# Patient Record
Sex: MALE | Race: WHITE | NOT HISPANIC OR LATINO | URBAN - METROPOLITAN AREA
[De-identification: names, ages, dates, MRNs, and addresses within clinical notes are randomized per-mention and may not be internally consistent; named-entity substitution may affect disease eponyms.]

---

## 2021-04-15 DIAGNOSIS — Z23 ENCOUNTER FOR IMMUNIZATION: ICD-10-CM

## 2024-05-08 ENCOUNTER — OFFICE VISIT (OUTPATIENT)
Dept: PRIMARY CARE | Facility: CLINIC | Age: 79
End: 2024-05-08
Payer: MEDICARE

## 2024-05-08 VITALS
DIASTOLIC BLOOD PRESSURE: 84 MMHG | OXYGEN SATURATION: 95 % | HEIGHT: 68 IN | SYSTOLIC BLOOD PRESSURE: 136 MMHG | TEMPERATURE: 98.4 F | WEIGHT: 192.4 LBS | BODY MASS INDEX: 29.16 KG/M2 | HEART RATE: 57 BPM

## 2024-05-08 DIAGNOSIS — J30.9 ALLERGIC RHINITIS, UNSPECIFIED SEASONALITY, UNSPECIFIED TRIGGER: ICD-10-CM

## 2024-05-08 DIAGNOSIS — C61 MALIGNANT NEOPLASM OF PROSTATE (CMS/HCC): Primary | ICD-10-CM

## 2024-05-08 DIAGNOSIS — K62.1 ANORECTAL POLYP: ICD-10-CM

## 2024-05-08 DIAGNOSIS — G47.33 OBSTRUCTIVE SLEEP APNEA SYNDROME: ICD-10-CM

## 2024-05-08 DIAGNOSIS — K62.0 ANORECTAL POLYP: ICD-10-CM

## 2024-05-08 DIAGNOSIS — K64.9 HEMORRHOIDS, UNSPECIFIED HEMORRHOID TYPE: ICD-10-CM

## 2024-05-08 DIAGNOSIS — H54.7 LOW VISION, UNSPECIFIED LEFT EYE VISUAL IMPAIRMENT CATEGORY, UNSPECIFIED RIGHT EYE VISUAL IMPAIRMENT CATEGORY: ICD-10-CM

## 2024-05-08 DIAGNOSIS — H35.50 RETINAL DYSTROPHY: ICD-10-CM

## 2024-05-08 DIAGNOSIS — M50.30 DDD (DEGENERATIVE DISC DISEASE), CERVICAL: ICD-10-CM

## 2024-05-08 DIAGNOSIS — H35.50 HEREDITARY RETINAL DYSTROPHY: ICD-10-CM

## 2024-05-08 DIAGNOSIS — E78.2 MIXED HYPERLIPIDEMIA: ICD-10-CM

## 2024-05-08 PROBLEM — E78.5 DYSLIPIDEMIA: Status: RESOLVED | Noted: 2020-11-03 | Resolved: 2024-05-08

## 2024-05-08 PROBLEM — E78.5 DYSLIPIDEMIA: Status: ACTIVE | Noted: 2020-11-03

## 2024-05-08 PROCEDURE — 99204 OFFICE O/P NEW MOD 45 MIN: CPT | Performed by: INTERNAL MEDICINE

## 2024-05-08 RX ORDER — FLUTICASONE PROPIONATE 50 MCG
2 SPRAY, SUSPENSION (ML) NASAL DAILY
Qty: 16 G | Refills: 5 | Status: SHIPPED | OUTPATIENT
Start: 2024-05-08

## 2024-05-08 RX ORDER — BICALUTAMIDE 50 MG/1
50 TABLET, FILM COATED ORAL DAILY
COMMUNITY
Start: 2024-03-06 | End: 2024-05-08 | Stop reason: ALTCHOICE

## 2024-05-08 RX ORDER — CETIRIZINE HYDROCHLORIDE 5 MG/1
5 TABLET ORAL DAILY
COMMUNITY

## 2024-05-08 RX ORDER — ASPIRIN 81 MG/1
81 TABLET ORAL SEE ADMIN INSTRUCTIONS
COMMUNITY
End: 2024-05-08 | Stop reason: ALTCHOICE

## 2024-05-08 ASSESSMENT — ENCOUNTER SYMPTOMS
FEVER: 0
DYSPHORIC MOOD: 0
BRUISES/BLEEDS EASILY: 0
POLYDIPSIA: 0
DIARRHEA: 0
DYSURIA: 0
BACK PAIN: 0
JOINT SWELLING: 0
HEADACHES: 0
MYALGIAS: 0
CONSTIPATION: 0
NAUSEA: 0
TROUBLE SWALLOWING: 0
ABDOMINAL PAIN: 0
CHEST TIGHTNESS: 0
FREQUENCY: 0
NERVOUS/ANXIOUS: 0
WEAKNESS: 0
FATIGUE: 0
SHORTNESS OF BREATH: 0
DIZZINESS: 0

## 2024-05-08 ASSESSMENT — PATIENT HEALTH QUESTIONNAIRE - PHQ9: SUM OF ALL RESPONSES TO PHQ9 QUESTIONS 1 & 2: 0

## 2024-05-08 NOTE — PROGRESS NOTES
Subjective     Establish Care (Itchy ears.)        Patient ID: Romario Kaur is a 78 y.o. male presenting today for est care    78 year old man transferring care from Dustin Vera  Patient Active Problem List:     Allergic rhinitis     Anorectal polyp     DDD (degenerative disc disease), cervical     Hemorrhoids     Malignant neoplasm of prostate (CMS/HCC): Dr. ABREU     Obstructive sleep apnea syndrome     Retinal dystrophy     Hereditary retinal dystrophy     Mixed hyperlipidemia: Has declined statins.  No fam hx/o premature CAD or cerebrovasc disease.  No prior CAC     Low vision            The following have been reviewed and updated as appropriate in this visit:   Allergies  Meds  Problems       Review of Systems   Constitutional:  Negative for fatigue and fever.   HENT:  Negative for trouble swallowing.         Ear itching   Eyes:  Negative for visual disturbance.   Respiratory:  Negative for chest tightness and shortness of breath.    Cardiovascular:  Negative for chest pain and leg swelling.   Gastrointestinal:  Negative for abdominal pain, constipation, diarrhea and nausea.   Endocrine: Negative for polydipsia and polyuria.   Genitourinary:  Negative for dysuria and frequency.   Musculoskeletal:  Negative for back pain, joint swelling and myalgias.   Skin:  Negative for rash.   Neurological:  Negative for dizziness, weakness and headaches.   Hematological:  Does not bruise/bleed easily.   Psychiatric/Behavioral:  Negative for dysphoric mood. The patient is not nervous/anxious.    All other systems reviewed and are negative.    Current Outpatient Medications   Medication Sig Dispense Refill    cetirizine (ZyrTEC) 5 mg tablet Take 5 mg by mouth daily.      fluticasone propionate (FLONASE) 50 mcg/actuation nasal spray Administer 2 sprays into each nostril daily. 16 g 5     No current facility-administered medications for this visit.     Past Medical History:   Diagnosis Date    YANELIS (obstructive sleep  "apnea)     Pt uses C-pap    Prostate cancer (CMS/[facility])     Retinal dystrophy      No family history on file.  Allergies   Allergen Reactions    Hydromorphone      Other Reaction(s): nausea     No past surgical history on file.  Social History     Socioeconomic History    Marital status:      Spouse name: None    Number of children: None    Years of education: None    Highest education level: None   Tobacco Use    Smoking status: Never    Smokeless tobacco: Never   Substance and Sexual Activity    Alcohol use: Yes     Comment: Occ.    Drug use: Never     Outpatient Encounter Medications as of 5/8/2024   Medication Sig Dispense Refill    cetirizine (ZyrTEC) 5 mg tablet Take 5 mg by mouth daily.      fluticasone propionate (FLONASE) 50 mcg/actuation nasal spray Administer 2 sprays into each nostril daily. 16 g 5    [DISCONTINUED] aspirin 81 mg enteric coated tablet Take 81 mg by mouth See admin instr.      [DISCONTINUED] bicalutamide (CASODEX) 50 mg chemo tablet Take  50 mg daily       No facility-administered encounter medications on file as of 5/8/2024.      Objective     Vitals:   Vitals:    05/08/24 1522   BP: 136/84   BP Location: Left upper arm   Patient Position: Sitting   Pulse: (!) 57   Temp: 36.9 °C (98.4 °F)   SpO2: 95%   Weight: 87.3 kg (192 lb 6.4 oz)   Height: 1.727 m (5' 8\")       Physical Exam  Vitals and nursing note reviewed.   HENT:      Head: Normocephalic.      Ears:      Comments: Tms dull with fluid b/l     Mouth/Throat:      Mouth: Mucous membranes are moist.   Eyes:      Conjunctiva/sclera: Conjunctivae normal.   Neck:      Vascular: No carotid bruit.   Cardiovascular:      Rate and Rhythm: Normal rate and regular rhythm.   Pulmonary:      Effort: Pulmonary effort is normal.      Breath sounds: Normal breath sounds.   Abdominal:      General: There is no distension.      Palpations: Abdomen is soft.   Musculoskeletal:         General: No deformity.      Cervical back: Normal range of " motion.      Right lower leg: No edema.      Left lower leg: No edema.   Skin:     General: Skin is warm and dry.      Findings: No rash.   Neurological:      General: No focal deficit present.      Mental Status: He is alert. Mental status is at baseline.   Psychiatric:         Mood and Affect: Mood normal.         Behavior: Behavior normal.         Labs  Lab Results   Component Value Date    HGBA1C 5.8 (H) 03/06/2024     Component  Ref Range & Units 2 mo ago   Hemoglobin A1c  4.0 - 5.6 % 5.8 High    25OH Vitamin D, Total  25 - 80 ng/mL 20 Low    Total Cholesterol  100 - 200 mg/dL 256 High     Triglycerides  25 - 150 mg/dL 135    HDL Cholesterol  40 - 59 mg/dL 53    Non-HDL Chol (Calc)  0 - 159 mg/dL 203 High     LDL Cholesterol  0 - 129 mg/dL 176 High     TESTOSTERONE  193 - 740 ng/dL 576   Component  Ref Range & Units 2 mo ago   PSA, Total  0.00 - 4.00 ng/mL 12.33 High    Glucose  70 - 99 mg/dL 79    Urea Nitrogen  8 - 20 mg/dL 20    Creatinine  0.64 - 1.27 mg/dL 0.86    Sodium  136 - 144 mmol/L 137    Potassium  3.6 - 5.1 mmol/L 4.0    Chloride  101 - 111 mmol/L 103    Carbon Dioxide  22 - 32 mmol/L 30    Anion Gap  3 - 12 4 With hypoalbuminemia, anion gap correction is suggested using Na - (Cl+CO2) +  2.5*(4 - Albumin).  The lab reports Na - (Cl+CO2).   Calcium  8.9 - 10.3 mg/dL 9.8    Protein, Total  6.1 - 7.9 g/dL 7.5    Albumin  3.5 - 5.1 g/dL 4.3    ALT  17 - 63 U/L 16 Low     AST  15 - 41 U/L 19    Alkaline Phosphatase  38 - 126 U/L 75    Bilirubin, Total  0.3 - 1.2 mg/dL 0.5    White Blood Cells  4.0 - 11.0 THO/uL 6.0   Red Blood Cells  4.30 - 5.80 MIL/uL 4.16 Low    Hemoglobin  13.5 - 17.5 g/dL 13.6   Hematocrit  40 - 52 % 40   MCV  80 - 100 fL 96   MCH  27 - 33 pg 33   MCHC  31 - 36 g/dL 34   RDW  11.5 - 14.5 % 12.8   Platelets  150 - 400 THO/uL 215       PET 2/2024  .  Similar-appearing increased PSMA uptake of the left aspect of the prostate in keeping with known prostate cancer.   2.  No PSMA avid  lymphadenopathy or distant metastatic disease.       Assessment/Plan   Problem List Items Addressed This Visit          Nervous    Obstructive sleep apnea syndrome     Uses CPAP  Dr. Dimas Bran            Circulatory    Hemorrhoids       Digestive    Anorectal polyp     Sending for colonoscopy report            Genitourinary    Malignant neoplasm of prostate (CMS/HCC) - Primary      Dr. ABREU            Musculoskeletal    DDD (degenerative disc disease), cervical       Ears/Nose/Throat    Allergic rhinitis       Eye    Retinal dystrophy    Hereditary retinal dystrophy    Low vision       Other    Mixed hyperlipidemia     When ready:  I recommend   Calcium Score: Low Dose CAT scan (non-contrast) to screen for heart disease.  This is a probably a covered study.  If it is non-covered, then the cost is approx: $140.  Call to schedule: [phone].  Let me know when you want to order this    See below for dietary approach for lipid lowering:    [facility] in [place] for homeopathic therapies    Ophthalmologists  Derick Drake,  Dr. Sukhwinder Falk:  [phone]    [place]  Skyler Nguyễn MD: [phone]  Tung Root M.D.: [phone]   Liza Huynh M.D.:  [phone]    [place]  Tung Root M.D: [phone]      Flonase: 2 sprays per nostril daily (everyday)  Continue regular exercise    I am sending for your records from Dr. Vera to include your colonoscopy and vaccinations    Information for you from the American College of [employer]  Can I lower my cholesterol by changing my diet?  If you have high cholesterol, it might help to avoid or limit red meat, butter, fried foods, cheese, and other foods that have a lot of saturated fat. Other things that might help lower cholesterol include:  - Eating more soluble fiber - Soluble fiber is found in fruits, oats, barley, beans, and peas.  - A vegetarian or vegan diet - A vegetarian diet contains no meat.  "A vegan diet contains no animal products at all, including meat, eggs, or milk.  - Replacing meat with soy sometimes - Soy-based products include tofu and tempeh.  In general, you can improve your health by eating lots of fruits, vegetables, and whole grains. You can also cut back on carbohydrates, sweets, and processed foods.  Following a \"Mediterranean diet\" might help lower your cholesterol. This type of diet includes a lot of fruits, vegetables, nuts, and whole grains, and uses olive oil instead of other fats. It also includes some fish, poultry, and dairy products, but not a lot of red meat.  What about eggs?  Eggs are OK if you want to eat them, but don't overdo it. The news often has stories about the health benefits or risks of eggs. The truth is, eggs are a good source of protein and do not raise cholesterol much. Saturated fats (like in red meat, butter, and fried foods) affect cholesterol levels more than eggs do.  Are there specific foods that can lower my cholesterol?  Maybe. There are some foods that seem to help lower cholesterol, including:  - Foods rich in omega-3 fatty acids - Foods rich in omega-3 fatty acids include oily fish, and olive and canola oil. These foods seem to raise good cholesterol and might lower certain types of bad cholesterol. More important, studies show that people who eat lots of these foods are less likely than those who eat less of them to have heart disease. If you want, it's fine to eat 1 to 2 servings of oily fish a week (such as salmon, herring, or tuna).  - Nuts - Some studies show that eating certain nuts, such as walnuts, almonds, and pistachios, can help lower cholesterol and even lower the risk of heart attack or death.  - Fiber-rich foods - Fiber-rich foods, such as fruits, vegetables, beans, and oats, seem to lower cholesterol and are generally good for your health. Some doctors even recommend fiber supplements.  What about  foods that claim to lower " "cholesterol?  There are now many foods that have added plant extracts called \"sterols\" or \"stanols.\" Examples include special margarines such as Benecol and Promise Activ. Foods with added sterols or stanols can lower cholesterol.   Should I take supplements to lower my cholesterol?  Maybe. Some research has shown that certain supplements can lower cholesterol. But there is almost no research showing that supplements can help prevent heart attacks, strokes, or any of the problems caused by high cholesterol. If you decide to try supplements, keep in mind that in the [place], the government does not regulate supplements very well. That means that what's on a supplement's label is not always actually in the bottle.  Here are some supplements that might help with cholesterol:  - Red yeast rice - This supplement can contain the same ingredient that is in a prescription medicine to lower cholesterol. Red yeast rice helps lower cholesterol, but the products that you can buy might not always have much of the active ingredient. If you are interested in taking red yeast rice for your cholesterol, you should speak with your doctor to see if the prescription medicine is a better choice.  - Omega-3 fatty acid supplements - Some omega-3 fatty acid supplements, such as krill oil supplements, might help lower cholesterol.  What supplements don't work?  There is no good evidence that calcium, garlic, coconut oil, coconut water, resveratrol, policosanol, or soy isoflavone supplements are helpful in lowering cholesterol.    I spent 50 minutes on this date of service performing the following activities: obtaining history, performing examination, entering orders, documenting, preparing for visit, and providing counseling and education.    The patient, Romario Kaur, demonstrates understanding and agreement with the above plan as well as reasons for seeking more immediate follow up care.     Neetu Farah MD    "

## 2024-05-08 NOTE — PATIENT INSTRUCTIONS
"When ready:  I recommend   Calcium Score: Low Dose CAT scan (non-contrast) to screen for heart disease.  This is a probably a covered study.  If it is non-covered, then the cost is approx: $140.  Call to schedule: [phone].  Let me know when you want to order this    See below for dietary approach for lipid lowering:    [facility] in [place] for homeopathic therapies    Ophthalmologists  Derick Drake,  Dr. Sukhwinder Falk:  [phone]    [place]  Skyler Nguyễn MD: [phone]  Tung Root M.D.: [phone]   Liza Huynh M.D.:  [phone]    [place]  Tung Root M.D: [phone]      Flonase: 2 sprays per nostril daily (everyday)  Continue regular exercise    I am sending for your records from Dr. Vera to include your colonoscopy and vaccinations    Information for you from the American College of Cardiology  Can I lower my cholesterol by changing my diet?  If you have high cholesterol, it might help to avoid or limit red meat, butter, fried foods, cheese, and other foods that have a lot of saturated fat. Other things that might help lower cholesterol include:  - Eating more soluble fiber - Soluble fiber is found in fruits, oats, barley, beans, and peas.  - A vegetarian or vegan diet - A vegetarian diet contains no meat. A vegan diet contains no animal products at all, including meat, eggs, or milk.  - Replacing meat with soy sometimes - Soy-based products include tofu and tempeh.  In general, you can improve your health by eating lots of fruits, vegetables, and whole grains. You can also cut back on carbohydrates, sweets, and processed foods.  Following a \"Mediterranean diet\" might help lower your cholesterol. This type of diet includes a lot of fruits, vegetables, nuts, and whole grains, and uses olive oil instead of other fats. It also includes some fish, poultry, and dairy products, but not a lot of red meat.  What about eggs?  Eggs are " "OK if you want to eat them, but don't overdo it. The news often has stories about the health benefits or risks of eggs. The truth is, eggs are a good source of protein and do not raise cholesterol much. Saturated fats (like in red meat, butter, and fried foods) affect cholesterol levels more than eggs do.  Are there specific foods that can lower my cholesterol?  Maybe. There are some foods that seem to help lower cholesterol, including:  - Foods rich in omega-3 fatty acids - Foods rich in omega-3 fatty acids include oily fish, and olive and canola oil. These foods seem to raise good cholesterol and might lower certain types of bad cholesterol. More important, studies show that people who eat lots of these foods are less likely than those who eat less of them to have heart disease. If you want, it's fine to eat 1 to 2 servings of oily fish a week (such as salmon, herring, or tuna).  - Nuts - Some studies show that eating certain nuts, such as walnuts, almonds, and pistachios, can help lower cholesterol and even lower the risk of heart attack or death.  - Fiber-rich foods - Fiber-rich foods, such as fruits, vegetables, beans, and oats, seem to lower cholesterol and are generally good for your health. Some doctors even recommend fiber supplements.  What about  foods that claim to lower cholesterol?  There are now many foods that have added plant extracts called \"sterols\" or \"stanols.\" Examples include special margarines such as Benecol and Promise Activ. Foods with added sterols or stanols can lower cholesterol.   Should I take supplements to lower my cholesterol?  Maybe. Some research has shown that certain supplements can lower cholesterol. But there is almost no research showing that supplements can help prevent heart attacks, strokes, or any of the problems caused by high cholesterol. If you decide to try supplements, keep in mind that in the [place], the government does not regulate supplements very well. That " means that what's on a supplement's label is not always actually in the bottle.  Here are some supplements that might help with cholesterol:  - Red yeast rice - This supplement can contain the same ingredient that is in a prescription medicine to lower cholesterol. Red yeast rice helps lower cholesterol, but the products that you can buy might not always have much of the active ingredient. If you are interested in taking red yeast rice for your cholesterol, you should speak with your doctor to see if the prescription medicine is a better choice.  - Omega-3 fatty acid supplements - Some omega-3 fatty acid supplements, such as krill oil supplements, might help lower cholesterol.  What supplements don't work?  There is no good evidence that calcium, garlic, coconut oil, coconut water, resveratrol, policosanol, or soy isoflavone supplements are helpful in lowering cholesterol.

## 2024-11-27 PROCEDURE — 99490 CHRNC CARE MGMT STAFF 1ST 20: CPT

## 2024-12-19 PROCEDURE — 99490 CHRNC CARE MGMT STAFF 1ST 20: CPT

## 2025-01-09 PROCEDURE — 99490 CHRNC CARE MGMT STAFF 1ST 20: CPT

## 2025-02-04 PROCEDURE — 99490 CHRNC CARE MGMT STAFF 1ST 20: CPT

## 2025-03-05 PROCEDURE — 99490 CHRNC CARE MGMT STAFF 1ST 20: CPT

## 2025-04-07 ENCOUNTER — TELEPHONE (OUTPATIENT)
Dept: PRIMARY CARE | Facility: CLINIC | Age: 80
End: 2025-04-07

## 2025-04-07 PROCEDURE — 99490 CHRNC CARE MGMT STAFF 1ST 20: CPT

## 2025-04-07 NOTE — TELEPHONE ENCOUNTER
Lvm that appt has been cancelled today   is out sick  Schedule next available and put on cancellation list

## 2025-04-07 NOTE — TELEPHONE ENCOUNTER
Patient came in the office and rescheduled todays appt    Also stated he has an all over body itch. Not sure if its from his treatments or not. Oncology gave a little topical cream tube but its not going to be enough for his whole body.    Patient states he has a dermatology appt coming up in 3 wks. He is going to reach out to them and see if they can prescribe something before his appt. Told him to start there and give a call if he is unable to get something

## 2025-07-02 ENCOUNTER — TELEPHONE (OUTPATIENT)
Dept: PRIMARY CARE | Facility: CLINIC | Age: 80
End: 2025-07-02
Payer: MEDICARE

## 2025-07-02 DIAGNOSIS — R73.03 PREDIABETES: ICD-10-CM

## 2025-07-02 DIAGNOSIS — C61 MALIGNANT NEOPLASM OF PROSTATE (CMS/HCC): ICD-10-CM

## 2025-07-02 DIAGNOSIS — E55.9 VITAMIN D DEFICIENCY: ICD-10-CM

## 2025-07-02 DIAGNOSIS — R73.9 HYPERGLYCEMIA: ICD-10-CM

## 2025-07-02 DIAGNOSIS — E78.2 MIXED HYPERLIPIDEMIA: Primary | ICD-10-CM

## 2025-07-02 NOTE — TELEPHONE ENCOUNTER
Patient scheduled for August follow up. Would you like to order labs to have completed before his appt?    Please let the patient know.

## 2025-07-03 PROBLEM — R73.03 PREDIABETES: Status: ACTIVE | Noted: 2025-07-03

## 2025-07-03 PROBLEM — E55.9 VITAMIN D DEFICIENCY: Status: ACTIVE | Noted: 2025-07-03

## 2025-07-03 NOTE — TELEPHONE ENCOUNTER
Hilda hung up on me she thought I was a . I called back told her I am from Dr. Farah office,I needed to speak with Romario, told her to have him call me back, but I'm just going to mail the orders.

## 2025-08-25 PROCEDURE — 99490 CHRNC CARE MGMT STAFF 1ST 20: CPT

## 2025-08-26 ENCOUNTER — APPOINTMENT (OUTPATIENT)
Dept: LAB | Facility: HOSPITAL | Age: 80
End: 2025-08-26
Attending: INTERNAL MEDICINE
Payer: MEDICARE

## 2025-08-26 DIAGNOSIS — E55.9 AVITAMINOSIS D: ICD-10-CM

## 2025-08-26 DIAGNOSIS — C61 MALIGNANT NEOPLASM OF PROSTATE (CMS/HCC): ICD-10-CM

## 2025-08-26 DIAGNOSIS — R73.03 DIABETES MELLITUS, LATENT: ICD-10-CM

## 2025-08-26 DIAGNOSIS — E78.2 MIXED HYPERLIPIDEMIA: ICD-10-CM

## 2025-08-26 DIAGNOSIS — R73.9 BLOOD GLUCOSE ELEVATED: ICD-10-CM

## 2025-08-26 LAB
25(OH)D3 SERPL-MCNC: 28 NG/ML (ref 30–100)
ALBUMIN SERPL-MCNC: 4.4 G/DL (ref 3.5–5.7)
ALP SERPL-CCNC: 82 IU/L (ref 34–125)
ALT SERPL-CCNC: 16 IU/L (ref 7–52)
ANION GAP SERPL CALC-SCNC: 7 MEQ/L (ref 3–15)
AST SERPL-CCNC: 24 IU/L (ref 13–39)
BILIRUB SERPL-MCNC: 0.6 MG/DL (ref 0.3–1.2)
BUN SERPL-MCNC: 14 MG/DL (ref 7–25)
CALCIUM SERPL-MCNC: 9.6 MG/DL (ref 8.6–10.3)
CHLORIDE SERPL-SCNC: 103 MEQ/L (ref 98–107)
CHOLEST SERPL-MCNC: 264 MG/DL
CO2 SERPL-SCNC: 29 MEQ/L (ref 21–31)
CREAT SERPL-MCNC: 0.6 MG/DL (ref 0.7–1.3)
EGFRCR SERPLBLD CKD-EPI 2021: >60 ML/MIN/1.73M*2
EST. AVERAGE GLUCOSE BLD GHB EST-MCNC: 105 MG/DL
GLUCOSE SERPL-MCNC: 95 MG/DL (ref 70–99)
HBA1C MFR BLD: 5.3 %
HDLC SERPL-MCNC: 58 MG/DL
HDLC SERPL: 4.6 {RATIO}
LDLC SERPL CALC-MCNC: 190 MG/DL
NONHDLC SERPL-MCNC: 206 MG/DL
POTASSIUM SERPL-SCNC: 3.9 MEQ/L (ref 3.5–5.1)
PROT SERPL-MCNC: 7.1 G/DL (ref 6–8.2)
PSA SERPL-MCNC: 0.61 NG/ML
SODIUM SERPL-SCNC: 139 MEQ/L (ref 136–145)
TRIGL SERPL-MCNC: 81 MG/DL
TSH SERPL DL<=0.05 MIU/L-ACNC: 0.77 MIU/L (ref 0.34–5.6)

## 2025-08-26 PROCEDURE — 80053 COMPREHEN METABOLIC PANEL: CPT

## 2025-08-26 PROCEDURE — 80061 LIPID PANEL: CPT

## 2025-08-26 PROCEDURE — 36415 COLL VENOUS BLD VENIPUNCTURE: CPT

## 2025-08-26 PROCEDURE — 83036 HEMOGLOBIN GLYCOSYLATED A1C: CPT

## 2025-08-26 PROCEDURE — 84443 ASSAY THYROID STIM HORMONE: CPT

## 2025-08-26 PROCEDURE — 82306 VITAMIN D 25 HYDROXY: CPT

## 2025-08-26 PROCEDURE — 84153 ASSAY OF PSA TOTAL: CPT

## 2025-08-29 ENCOUNTER — OFFICE VISIT (OUTPATIENT)
Dept: PRIMARY CARE | Facility: CLINIC | Age: 80
End: 2025-08-29
Payer: MEDICARE

## 2025-08-29 VITALS
BODY MASS INDEX: 29.98 KG/M2 | HEIGHT: 67 IN | TEMPERATURE: 97.8 F | HEART RATE: 73 BPM | SYSTOLIC BLOOD PRESSURE: 130 MMHG | OXYGEN SATURATION: 97 % | WEIGHT: 191 LBS | DIASTOLIC BLOOD PRESSURE: 80 MMHG

## 2025-08-29 DIAGNOSIS — Z12.11 SCREEN FOR COLON CANCER: ICD-10-CM

## 2025-08-29 DIAGNOSIS — E78.2 MIXED HYPERLIPIDEMIA: ICD-10-CM

## 2025-08-29 DIAGNOSIS — C61 MALIGNANT NEOPLASM OF PROSTATE (CMS/HCC): Primary | ICD-10-CM

## 2025-08-29 DIAGNOSIS — F51.01 PRIMARY INSOMNIA: ICD-10-CM

## 2025-08-29 DIAGNOSIS — Z13.6 SCREENING, ISCHEMIC HEART DISEASE: ICD-10-CM

## 2025-08-29 DIAGNOSIS — Z23 NEED FOR PNEUMOCOCCAL VACCINATION: ICD-10-CM

## 2025-08-29 DIAGNOSIS — G47.33 OBSTRUCTIVE SLEEP APNEA SYNDROME: ICD-10-CM

## 2025-08-29 DIAGNOSIS — K62.0 ANORECTAL POLYP: ICD-10-CM

## 2025-08-29 DIAGNOSIS — K62.1 ANORECTAL POLYP: ICD-10-CM

## 2025-08-29 PROCEDURE — 90677 PCV20 VACCINE IM: CPT | Performed by: INTERNAL MEDICINE

## 2025-08-29 PROCEDURE — G0009 ADMIN PNEUMOCOCCAL VACCINE: HCPCS | Performed by: INTERNAL MEDICINE

## 2025-08-29 PROCEDURE — 99214 OFFICE O/P EST MOD 30 MIN: CPT | Mod: 25 | Performed by: INTERNAL MEDICINE

## 2025-08-29 RX ORDER — MONTELUKAST SODIUM 10 MG/1
10 TABLET ORAL NIGHTLY
Qty: 90 TABLET | Refills: 4 | Status: SHIPPED | OUTPATIENT
Start: 2025-08-29

## 2025-08-29 RX ORDER — TAMSULOSIN HYDROCHLORIDE 0.4 MG/1
0.4 CAPSULE ORAL NIGHTLY
COMMUNITY
End: 2025-08-29 | Stop reason: SDUPTHER

## 2025-08-29 RX ORDER — CETIRIZINE HYDROCHLORIDE 10 MG/1
10 TABLET ORAL NIGHTLY
Start: 2025-08-29

## 2025-08-29 RX ORDER — TAMSULOSIN HYDROCHLORIDE 0.4 MG/1
0.4 CAPSULE ORAL NIGHTLY
Qty: 90 CAPSULE | Refills: 4 | Status: SHIPPED | OUTPATIENT
Start: 2025-08-29

## 2025-08-29 RX ORDER — BENZOCAINE .13; .15; .5; 2 G/100G; G/100G; G/100G; G/100G
2 GEL ORAL DAILY
Qty: 8.43 ML | Refills: 11 | Status: SHIPPED | OUTPATIENT
Start: 2025-08-29 | End: 2026-08-29

## 2025-08-29 RX ORDER — MOMETASONE FUROATE 1 MG/G
1 CREAM TOPICAL 2 TIMES DAILY
COMMUNITY
Start: 2025-03-11

## 2025-08-29 RX ORDER — CHOLECALCIFEROL (VITAMIN D3) 50 MCG
2000 TABLET ORAL DAILY
Start: 2025-08-29 | End: 2026-08-29

## 2025-08-29 ASSESSMENT — PATIENT HEALTH QUESTIONNAIRE - PHQ9: SUM OF ALL RESPONSES TO PHQ9 QUESTIONS 1 & 2: 0

## 2025-08-29 ASSESSMENT — ENCOUNTER SYMPTOMS
FEVER: 0
MEMORY LOSS: 0
WEIGHT LOSS: 0
INSOMNIA: 0
ABDOMINAL PAIN: 0
WEIGHT GAIN: 0
NIGHT SWEATS: 0
POOR WOUND HEALING: 0
DYSPNEA ON EXERTION: 0
VERTIGO: 0
WEAKNESS: 0
PERSISTENT INFECTIONS: 0
DEPRESSION: 0
DIZZINESS: 0
ADENOPATHY: 0
NERVOUS/ANXIOUS: 0
HEADACHES: 0
SHORTNESS OF BREATH: 0
TREMORS: 0